# Patient Record
(demographics unavailable — no encounter records)

---

## 2025-03-10 NOTE — PHYSICAL EXAM
[2+] : left foot dorsalis pedis 2+ [Varicose Veins Of Lower Extremities] : not present [FreeTextEntry3] : Temperature gradient within normal limits. CFT: 3 seconds x10. Positive hair growth at the digits. 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 2 and 3 left foot.  There is slightly shorter 1st ray on the left. Regarding the right foot, there is a healed dorsal medial incision from previous bunion surgery. There is negative pain on ROM of the 1st MPJ. Negative effusion or swelling of the joint. There is hammertoe deformities of 2 and 3, which are semi-rigid in nature. They are elongated and there is adductovarus rotation of the 4th digit which appears to be flail. There is negative pain on direct pressure or palpation of that 4th digit right foot. There is tenderness on palpation submet. 2 left foot. [Vibration Dec.] : normal vibratory sensation at the level of the toes [FreeTextEntry1] : [place] monofilament testing intact at all points bilaterally.

## 2025-03-10 NOTE — PHYSICAL EXAM
Administered 1 cc Vitamin B12 1000mcg/cc to right upper outer gluteal. Pt tolerated well. No acute reaction noted to site. Pt instructed on S/S to report. Advised patient to wait in lobby 15 minutes after receiving injection to monitor for any reactions. Pt verbalized understanding.     Lot: [id number]  Exp: [id number]  Administered 2 cc DepoMedrol 80mg/cc  to right upper outer gluteal. Pt tolerated well. No acute reaction noted to site. Pt instructed on S/S to report. Advised patient to wait in lobby 15 minutes after receiving injection to monitor for any reactions..  Pt verbalized understanding.     Lot: [id number]  Exp: 05/19  Administered 2 cc  Toradol 30mg/cc  to left dorsogluteal. Pt tolerated well. No acute reaction noted to site. Pt instructed on S/S to report. Advised patient to wait in lobby 15 minutes after receiving injection to monitor for any reactions. Pt verbalized understanding.     Lot: -[id number]  Exp: [id number]  Administered 1g Cefrtriaxone (Rocephin)  to left gluteal. Pt tolerated well. No acute reaction noted to site. Pt instructed on S/S to report. Advised patient to wait in lobby 15 minutes after receiving injection to monitor for any reactions. Pt verbalized understanding.     Lot: [id number]  Exp: 11/2020   [2+] : left foot dorsalis pedis 2+ [Varicose Veins Of Lower Extremities] : not present [FreeTextEntry3] : Temperature gradient within normal limits. CFT: 3 seconds x10. Positive hair growth at the digits. 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 2 and 3 left foot.  There is slightly shorter 1st ray on the left. Regarding the right foot, there is a healed dorsal medial incision from previous bunion surgery. There is negative pain on ROM of the 1st MPJ. Negative effusion or swelling of the joint. There is hammertoe deformities of 2 and 3, which are semi-rigid in nature. They are elongated and there is adductovarus rotation of the 4th digit which appears to be flail. There is negative pain on direct pressure or palpation of that 4th digit right foot. There is tenderness on palpation submet. 2 left foot. [Vibration Dec.] : normal vibratory sensation at the level of the toes [FreeTextEntry1] : [place] monofilament testing intact at all points bilaterally.

## 2025-03-10 NOTE — HISTORY OF PRESENT ILLNESS
[FreeTextEntry1] : Patient presents today as a new patient for diabetic pedal care. She is concerned about discoloration of her toenails, and she has a painful lesion in the plantar aspect of the left foot. She denies any recent trauma to the foot. She denies any trauma to the nails. She likes to wear high heels for the most part. She does have some issues with shoe gear especially with the right foot. 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 She denies any pain in the 4th toe right foot at this time, but she has discomfort on the 3rd and 2nd on that right foot especially wearing particular shoe gear. She denies any recent history of trauma to the foot   She does not go for pedicures. She takes care of her own nails. Patient is diabetic. Her most recent hemoglobin A1c was 11, which has decreased over the last month or 2 since starting Mounjaro. She does follow-up with her medical doctor for her prescriptions. She denies any tingling or numbness in her feet. She denies any intermittent claudication type symptoms.

## 2025-03-10 NOTE — PHYSICAL EXAM
[2+] : left foot dorsalis pedis 2+ [Varicose Veins Of Lower Extremities] : not present [FreeTextEntry3] : Temperature gradient within normal limits. CFT: 3 seconds x10. Positive hair growth at the digits. 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 2 and 3 left foot.  There is slightly shorter 1st ray on the left. Regarding the right foot, there is a healed dorsal medial incision from previous bunion surgery. There is negative pain on ROM of the 1st MPJ. Negative effusion or swelling of the joint. There is hammertoe deformities of 2 and 3, which are semi-rigid in nature. They are elongated and there is adductovarus rotation of the 4th digit which appears to be flail. There is negative pain on direct pressure or palpation of that 4th digit right foot. There is tenderness on palpation submet. 2 left foot. [Vibration Dec.] : normal vibratory sensation at the level of the toes [FreeTextEntry1] : [place]-[employer] monofilament testing intact at all points bilaterally.

## 2025-03-10 NOTE — ASSESSMENT
[FreeTextEntry1] : Impression: Diabetic without complications.  Metatarsalgia left foot. Hammertoe deformity right foot.   Treatment: Discussed diabetic pedal care with the patient. She is to examine her feet daily. She is to avoid walking barefoot. She is to moisturize her feet daily as well. Patient is requesting a prescription for the dryness in the plantar aspect of both feet including the heels, especially the calcaneus. She was ePrescribed LacHydrin cream for daily application. she is to avoid using it interdigitally. Recommended Oofos for the patient to help off-load the forefoot and prevent any additional accumulation to prevent ulcerations or breaks in skin and decrease the fissuring in the plantar aspects of the calcanei. All nails were prepped and trimmed to appropriate hygienic length and the nails were smoothed with a rotary mahamed to decrease the height and remove any discoloration to the nails. There does not appear to have any fungal component at this time. I recommended Dr. Puentes Nail Polish and Base as it is all natural. Discussed it may take longer to dry due to the lack of chemical in the polish. The lesion submet. 2 and submet. 1, 3 in total, were prepped and trimmed with a sterile #15 blade without incidence. Patient is to return for yearly diabetic exam and call for an earlier appointment if there is any issue with recurrence, pain, redness or worsening of condition. Discussed additional surgical intervention for the hammertoe deformities of the right foot. She declined at this time. I recommended rounder, wider shoe gear. with a higher toe box to help accommodate and avoid excessive pressure to the hammertoes of the right foot.